# Patient Record
Sex: FEMALE | URBAN - METROPOLITAN AREA
[De-identification: names, ages, dates, MRNs, and addresses within clinical notes are randomized per-mention and may not be internally consistent; named-entity substitution may affect disease eponyms.]

---

## 2017-10-09 ENCOUNTER — IMPORTED ENCOUNTER (OUTPATIENT)
Dept: URBAN - METROPOLITAN AREA CLINIC 9 | Facility: CLINIC | Age: 37
End: 2017-10-09

## 2018-03-29 ENCOUNTER — IMPORTED ENCOUNTER (OUTPATIENT)
Dept: URBAN - METROPOLITAN AREA CLINIC 9 | Facility: CLINIC | Age: 38
End: 2018-03-29

## 2018-04-20 ENCOUNTER — IMPORTED ENCOUNTER (OUTPATIENT)
Dept: URBAN - METROPOLITAN AREA CLINIC 9 | Facility: CLINIC | Age: 38
End: 2018-04-20

## 2018-06-19 NOTE — PATIENT DISCUSSION
New Prescription: doxycycline hyclate (doxycycline hyclate): capsule: 50 mg 1 capsule twice a day by mouth 06-

## 2018-06-19 NOTE — PATIENT DISCUSSION
rx doxy 50mg bid 30 days adn warm compresses bid.  consider excision wiht dr Bindu Estrada if no improvement

## 2018-06-27 NOTE — PATIENT DISCUSSION
New Prescription: TobraDex (tobramycin-dexamethasone): ointment: 0.3-0.1% 1 a small amount at bedtime as directed into affected eye 06-

## 2018-06-27 NOTE — PATIENT DISCUSSION
Continue: doxycycline hyclate (doxycycline hyclate): capsule: 50 mg 1 capsule twice a day by mouth 06-

## 2018-06-27 NOTE — PATIENT DISCUSSION
CHALAZION OS: PRESCRIBED WARM COMPRESSES, EYELID SCRUBS AND TOBRADEX EVETTE QHS FOR TWO WEEKS THEN PRN.

## 2018-10-22 ENCOUNTER — IMPORTED ENCOUNTER (OUTPATIENT)
Dept: URBAN - METROPOLITAN AREA CLINIC 9 | Facility: CLINIC | Age: 38
End: 2018-10-22

## 2018-10-22 PROBLEM — H04.123: Noted: 2018-10-22

## 2018-10-22 PROBLEM — D31.32: Noted: 2018-10-22

## 2018-10-22 PROBLEM — H40.013: Noted: 2018-10-22

## 2021-10-16 ASSESSMENT — TONOMETRY
OS_IOP_MMHG: 17
OS_IOP_MMHG: 19
OD_IOP_MMHG: 14
OD_IOP_MMHG: 20
OS_IOP_MMHG: 14
OD_IOP_MMHG: 18

## 2021-10-16 ASSESSMENT — VISUAL ACUITY
OD_SC: 20/25 -2 SN
OD_SC: 20/20 SN
OD_SC: 20/25 SN
OS_SC: 20/30 - SN
OS_SC: 20/30 - SN
OS_SC: 20/20 SN

## 2021-10-16 ASSESSMENT — PACHYMETRY
OD_CT_UM: 631.0
OS_CT_UM: 601.0

## 2023-11-07 ENCOUNTER — NEW PATIENT (OUTPATIENT)
Facility: LOCATION | Age: 43
End: 2023-11-07

## 2023-11-07 DIAGNOSIS — H04.123: ICD-10-CM

## 2023-11-07 DIAGNOSIS — H40.013: ICD-10-CM

## 2023-11-07 DIAGNOSIS — H52.13: ICD-10-CM

## 2023-11-07 DIAGNOSIS — D31.32: ICD-10-CM

## 2023-11-07 PROCEDURE — 92133 CPTRZD OPH DX IMG PST SGM ON: CPT

## 2023-11-07 PROCEDURE — 92250 FUNDUS PHOTOGRAPHY W/I&R: CPT

## 2023-11-07 PROCEDURE — 76514 ECHO EXAM OF EYE THICKNESS: CPT

## 2023-11-07 PROCEDURE — 92004 COMPRE OPH EXAM NEW PT 1/>: CPT

## 2023-11-07 ASSESSMENT — VISUAL ACUITY
OD_SC: 20/20-1
OS_SC: 20/25-1

## 2023-11-07 ASSESSMENT — TONOMETRY
OS_IOP_MMHG: 17
OD_IOP_MMHG: 17

## 2023-11-07 ASSESSMENT — PACHYMETRY
OD_CT_UM: 602
OS_CT_UM: 583

## 2024-10-31 ENCOUNTER — TECH ONLY (OUTPATIENT)
Facility: LOCATION | Age: 44
End: 2024-10-31

## 2024-10-31 DIAGNOSIS — H40.013: ICD-10-CM

## 2024-10-31 PROCEDURE — 92083 EXTENDED VISUAL FIELD XM: CPT

## 2024-11-11 ENCOUNTER — COMPREHENSIVE EXAM (OUTPATIENT)
Facility: LOCATION | Age: 44
End: 2024-11-11

## 2024-11-11 DIAGNOSIS — H02.88A: ICD-10-CM

## 2024-11-11 DIAGNOSIS — H52.13: ICD-10-CM

## 2024-11-11 DIAGNOSIS — D31.32: ICD-10-CM

## 2024-11-11 DIAGNOSIS — H40.013: ICD-10-CM

## 2024-11-11 DIAGNOSIS — H02.88B: ICD-10-CM

## 2024-11-11 DIAGNOSIS — H04.123: ICD-10-CM

## 2024-11-11 PROCEDURE — 92014 COMPRE OPH EXAM EST PT 1/>: CPT

## 2024-11-11 PROCEDURE — 92015 DETERMINE REFRACTIVE STATE: CPT

## 2024-11-11 PROCEDURE — 92133 CPTRZD OPH DX IMG PST SGM ON: CPT
